# Patient Record
Sex: MALE | Race: WHITE | NOT HISPANIC OR LATINO | URBAN - METROPOLITAN AREA
[De-identification: names, ages, dates, MRNs, and addresses within clinical notes are randomized per-mention and may not be internally consistent; named-entity substitution may affect disease eponyms.]

---

## 2023-05-08 ENCOUNTER — OFFICE VISIT (OUTPATIENT)
Dept: URBAN - METROPOLITAN AREA CLINIC 9 | Facility: CLINIC | Age: 48
End: 2023-05-08
Payer: COMMERCIAL

## 2023-05-08 ENCOUNTER — WEB ENCOUNTER (OUTPATIENT)
Dept: URBAN - METROPOLITAN AREA CLINIC 9 | Facility: CLINIC | Age: 48
End: 2023-05-08

## 2023-05-08 VITALS
HEIGHT: 70 IN | DIASTOLIC BLOOD PRESSURE: 80 MMHG | BODY MASS INDEX: 33.5 KG/M2 | SYSTOLIC BLOOD PRESSURE: 140 MMHG | WEIGHT: 234 LBS

## 2023-05-08 DIAGNOSIS — R74.8 ELEVATED LIVER ENZYMES: ICD-10-CM

## 2023-05-08 DIAGNOSIS — R74.01 ELEVATED LIVER TRANSAMINASE LEVEL: ICD-10-CM

## 2023-05-08 DIAGNOSIS — Z12.11 SCREENING FOR COLON CANCER: ICD-10-CM

## 2023-05-08 DIAGNOSIS — R79.89 ELEVATED LIVER FUNCTION TESTS: ICD-10-CM

## 2023-05-08 PROCEDURE — 99244 OFF/OP CNSLTJ NEW/EST MOD 40: CPT | Performed by: STUDENT IN AN ORGANIZED HEALTH CARE EDUCATION/TRAINING PROGRAM

## 2023-05-08 PROCEDURE — 99204 OFFICE O/P NEW MOD 45 MIN: CPT | Performed by: STUDENT IN AN ORGANIZED HEALTH CARE EDUCATION/TRAINING PROGRAM

## 2023-05-08 RX ORDER — TESTOSTERONE CYPIONATE 200 MG/ML
INJECTION, SOLUTION INTRAMUSCULAR
Qty: 2 | Status: ACTIVE | COMMUNITY

## 2023-05-08 RX ORDER — LOSARTAN POTASSIUM 50 MG/1
1 TABLET TABLET ORAL TWICE DAILY
Status: ACTIVE | COMMUNITY

## 2023-05-08 RX ORDER — SERTRALINE HYDROCHLORIDE 100 MG/1
TABLET, FILM COATED ORAL
Qty: 30 TABLET | Status: ACTIVE | COMMUNITY

## 2023-05-08 RX ORDER — CHLORTHALIDONE 25 MG/1
TABLET ORAL
Qty: 90 TABLET | Status: ACTIVE | COMMUNITY

## 2023-05-08 NOTE — HPI-TODAY'S VISIT:
Patient has a history of HTN, on testosterone, on sertraline who presents for elevated LFTs.  GI Hx: Off crestor 1-2 weeks ago. Otherwise asymptomatic. No RUQ pain. Denies weight loss, fever, chills, abdominal pain, nausea, vomiting, diarrhea.  Denies dysphagia, reflux, UGI symptoms. Denies hematemesis, melena, hematochezia, blood per rectum.  Aunt colon cancer at age 75.  EGD: None  Colonoscopy: Due, discussed to get liver enzymes figured out first, then will arrange for colonoscopy. Was scheduled with an G physician.  Imaging/Studies/Procedures: 10/19/2020- AST 26, ALT 23. 10/26/22- AST 41, ALT 57. 4/7/23- , ALT 95, T bili 1.6. CT renal 4/7/23- gallstones, liver normal. 4/19/23- CMP (AST 86, ), CBC normal. HIV, Hep C ab normal. LIZ 1:40.

## 2023-05-09 ENCOUNTER — WEB ENCOUNTER (OUTPATIENT)
Dept: URBAN - METROPOLITAN AREA CLINIC 9 | Facility: CLINIC | Age: 48
End: 2023-05-09

## 2023-05-13 ENCOUNTER — WEB ENCOUNTER (OUTPATIENT)
Dept: URBAN - METROPOLITAN AREA CLINIC 9 | Facility: CLINIC | Age: 48
End: 2023-05-13

## 2023-05-18 LAB
IMMUNOGLOBULIN A: 241
IMMUNOGLOBULIN G: 962
IMMUNOGLOBULIN M: 141

## 2023-05-26 LAB
% SATURATION: 34
(TTG) AB, IGA: <1
(TTG) AB, IGG: <1
A/G RATIO: 2.1
ABSOLUTE BASOPHILS: 59
ABSOLUTE EOSINOPHILS: 142
ABSOLUTE LYMPHOCYTES: 1817
ABSOLUTE MONOCYTES: 502
ABSOLUTE NEUTROPHILS: 3381
ACTIN (SMOOTH MUSCLE) ANTIBODY (IGG): <20
ALBUMIN: 4.8
ALKALINE PHOSPHATASE: 68
ALPHA-1-ANTITRYPSIN (AAT) PHENOTYPE: (no result)
ALT (SGPT): 65
ANA SCREEN, IFA: POSITIVE
AST (SGOT): 33
BASOPHILS: 1
BILIRUBIN, TOTAL: 0.8
BUN/CREATININE RATIO: (no result)
BUN: 14
CALCIUM: 9.6
CARBON DIOXIDE, TOTAL: 27
CERULOPLASMIN: 25
CHLORIDE: 99
CREATININE: 1.22
EGFR: 75
EOSINOPHILS: 2.4
FERRITIN: 84
GGT: 46
GLIADIN (DEAMIDATED) AB (IGA): <1
GLIADIN (DEAMIDATED) AB (IGG): <1
GLOBULIN, TOTAL: 2.3
GLUCOSE: 106
HEMATOCRIT: 49.5
HEMOGLOBIN: 16.3
HEPATITIS B CORE AB TOTAL: (no result)
HEPATITIS B SURFACE AB IMMUNITY, QN: <5
HEPATITIS B SURFACE ANTIGEN: (no result)
IMMUNOGLOBULIN A: 235
INR: 1.1
IRON BINDING CAPACITY: 449
IRON, TOTAL: 152
LYMPHOCYTES: 30.8
MCH: 27.1
MCHC: 32.9
MCV: 82.2
MITOCHONDRIAL (M2) ANTIBODY: 20.3
MONOCYTES: 8.5
MPV: 9.6
NEUTROPHILS: 57.3
PLATELET COUNT: 226
POTASSIUM: 4.2
PROTEIN, TOTAL: 7.1
PT: 10.7
RDW: 18.2
RED BLOOD CELL COUNT: 6.02
RHEUMATOID FACTOR: <14
SJOGREN'S ANTIBODY (SS-A): (no result)
SJOGREN'S ANTIBODY (SS-B): (no result)
SODIUM: 137
TSH: 2.4
WHITE BLOOD CELL COUNT: 5.9

## 2023-06-05 ENCOUNTER — DASHBOARD ENCOUNTERS (OUTPATIENT)
Age: 48
End: 2023-06-05

## 2023-06-05 ENCOUNTER — OFFICE VISIT (OUTPATIENT)
Dept: URBAN - METROPOLITAN AREA CLINIC 9 | Facility: CLINIC | Age: 48
End: 2023-06-05
Payer: COMMERCIAL

## 2023-06-05 VITALS
DIASTOLIC BLOOD PRESSURE: 80 MMHG | BODY MASS INDEX: 34.36 KG/M2 | SYSTOLIC BLOOD PRESSURE: 130 MMHG | WEIGHT: 240 LBS | HEIGHT: 70 IN

## 2023-06-05 DIAGNOSIS — Z12.11 SCREENING FOR COLON CANCER: ICD-10-CM

## 2023-06-05 DIAGNOSIS — R74.8 ELEVATED LIVER ENZYMES: ICD-10-CM

## 2023-06-05 DIAGNOSIS — K76.0 FATTY LIVER: ICD-10-CM

## 2023-06-05 PROCEDURE — 99214 OFFICE O/P EST MOD 30 MIN: CPT | Performed by: STUDENT IN AN ORGANIZED HEALTH CARE EDUCATION/TRAINING PROGRAM

## 2023-06-05 RX ORDER — LOSARTAN POTASSIUM 50 MG/1
1 TABLET TABLET ORAL TWICE DAILY
Status: DISCONTINUED | COMMUNITY

## 2023-06-05 RX ORDER — SERTRALINE HYDROCHLORIDE 100 MG/1
TABLET, FILM COATED ORAL
Qty: 30 TABLET | Status: DISCONTINUED | COMMUNITY

## 2023-06-05 RX ORDER — LISINOPRIL 10 MG/1
1 TABLET TABLET ORAL ONCE A DAY
Status: ACTIVE | COMMUNITY

## 2023-06-05 RX ORDER — TESTOSTERONE CYPIONATE 200 MG/ML
INJECTION, SOLUTION INTRAMUSCULAR
Qty: 2 | Status: ACTIVE | COMMUNITY

## 2023-06-05 RX ORDER — CHLORTHALIDONE 25 MG/1
TABLET ORAL
Qty: 90 TABLET | Status: DISCONTINUED | COMMUNITY

## 2023-06-05 RX ORDER — ROSUVASTATIN CALCIUM 20 MG/1
1 TABLET TABLET, FILM COATED ORAL ONCE A DAY
Status: ACTIVE | COMMUNITY

## 2023-06-05 NOTE — HPI-TODAY'S VISIT:
Patient has a history of HTN, on testosterone, on sertraline who presents for elevated LFTs.  GI Hx: 5/2023- Off crestor 1-2 weeks ago. Otherwise asymptomatic. No RUQ pain. 6/5/23- Feels much better after decreasing testoterone, stopping ETOH use. Denies weight loss, fever, chills, abdominal pain, nausea, vomiting, diarrhea.  Denies dysphagia, reflux, UGI symptoms. Denies hematemesis, melena, hematochezia, blood per rectum.  Aunt colon cancer at age 75.  EGD: None  Colonoscopy: Ordered.  Imaging/Studies/Procedures: 10/19/2020- AST 26, ALT 23. 10/26/22- AST 41, ALT 57. 4/7/23- , ALT 95, T bili 1.6. CT renal 4/7/23- gallstones, liver normal. 4/19/23- CMP (AST 86, ), CBC normal. HIV, Hep C ab normal. LIZ 1:40. RUQ U/S 5/15/23- gallstones, no inflammation, no biliry dilation. Fatty liver. 5/17/23- Fe 152, 34%, Ferritin 84. CMP (AST 33, ALT 65), GGT normal. TSH, ASMA, AMA 20.3 (borderline), celiac test, INR, CBC, ceru, Hep B non immune, non exposed, alpha 1 normal. LIZ + 1:40. Immuno globulins normal.

## 2023-06-07 PROBLEM — 197321007: Status: ACTIVE | Noted: 2023-06-07

## 2023-06-12 ENCOUNTER — CLAIMS CREATED FROM THE CLAIM WINDOW (OUTPATIENT)
Dept: URBAN - METROPOLITAN AREA SURGERY CENTER 9 | Facility: SURGERY CENTER | Age: 48
End: 2023-06-12
Payer: COMMERCIAL

## 2023-06-12 ENCOUNTER — CLAIMS CREATED FROM THE CLAIM WINDOW (OUTPATIENT)
Dept: URBAN - METROPOLITAN AREA CLINIC 4 | Facility: CLINIC | Age: 48
End: 2023-06-12
Payer: COMMERCIAL

## 2023-06-12 DIAGNOSIS — Z12.11 ENCOUNTER FOR SCREENING FOR MALIGNANT NEOPLASM OF COLON: ICD-10-CM

## 2023-06-12 DIAGNOSIS — K63.5 POLYP OF TRANSVERSE COLON, UNSPECIFIED TYPE: ICD-10-CM

## 2023-06-12 DIAGNOSIS — K64.0 FIRST DEGREE HEMORRHOIDS: ICD-10-CM

## 2023-06-12 DIAGNOSIS — K62.1 RECTAL POLYP: ICD-10-CM

## 2023-06-12 DIAGNOSIS — D12.3 BENIGN NEOPLASM OF TRANSVERSE COLON: ICD-10-CM

## 2023-06-12 PROCEDURE — 88305 TISSUE EXAM BY PATHOLOGIST: CPT | Performed by: PATHOLOGY

## 2023-06-12 PROCEDURE — 00811 ANES LWR INTST NDSC NOS: CPT | Performed by: NURSE ANESTHETIST, CERTIFIED REGISTERED

## 2023-06-12 PROCEDURE — 00812 ANES LWR INTST SCR COLSC: CPT | Performed by: NURSE ANESTHETIST, CERTIFIED REGISTERED

## 2023-06-12 PROCEDURE — 45380 COLONOSCOPY AND BIOPSY: CPT | Performed by: STUDENT IN AN ORGANIZED HEALTH CARE EDUCATION/TRAINING PROGRAM

## 2023-06-12 PROCEDURE — 45385 COLONOSCOPY W/LESION REMOVAL: CPT | Performed by: STUDENT IN AN ORGANIZED HEALTH CARE EDUCATION/TRAINING PROGRAM

## 2023-06-12 RX ORDER — LISINOPRIL 10 MG/1
1 TABLET TABLET ORAL ONCE A DAY
Status: ACTIVE | COMMUNITY

## 2023-06-12 RX ORDER — TESTOSTERONE CYPIONATE 200 MG/ML
INJECTION, SOLUTION INTRAMUSCULAR
Qty: 2 | Status: ACTIVE | COMMUNITY

## 2023-06-12 RX ORDER — ROSUVASTATIN CALCIUM 20 MG/1
1 TABLET TABLET, FILM COATED ORAL ONCE A DAY
Status: ACTIVE | COMMUNITY